# Patient Record
Sex: MALE | Race: WHITE
[De-identification: names, ages, dates, MRNs, and addresses within clinical notes are randomized per-mention and may not be internally consistent; named-entity substitution may affect disease eponyms.]

---

## 2019-08-03 ENCOUNTER — HOSPITAL ENCOUNTER (EMERGENCY)
Dept: HOSPITAL 94 - ER | Age: 84
Discharge: HOME | End: 2019-08-03
Payer: MEDICARE

## 2019-08-03 VITALS — HEIGHT: 69 IN | BODY MASS INDEX: 21.97 KG/M2 | WEIGHT: 148.3 LBS

## 2019-08-03 VITALS — DIASTOLIC BLOOD PRESSURE: 61 MMHG | SYSTOLIC BLOOD PRESSURE: 131 MMHG

## 2019-08-03 DIAGNOSIS — N13.8: ICD-10-CM

## 2019-08-03 DIAGNOSIS — I10: ICD-10-CM

## 2019-08-03 DIAGNOSIS — Z98.890: ICD-10-CM

## 2019-08-03 DIAGNOSIS — Z85.46: ICD-10-CM

## 2019-08-03 DIAGNOSIS — I25.10: ICD-10-CM

## 2019-08-03 DIAGNOSIS — Z95.1: ICD-10-CM

## 2019-08-03 DIAGNOSIS — Z90.49: ICD-10-CM

## 2019-08-03 DIAGNOSIS — N30.01: Primary | ICD-10-CM

## 2019-08-03 LAB
BACTERIA URNS QL MICRO: (no result) /HPF
BASOPHILS # BLD AUTO: 0 X10'3 (ref 0–0.2)
BASOPHILS NFR BLD AUTO: 0.4 % (ref 0–1)
CLARITY UR: (no result)
COLOR UR: (no result)
DEPRECATED SQUAMOUS URNS QL MICRO: (no result) /LPF
EOSINOPHIL # BLD AUTO: 0.1 X10'3 (ref 0–0.9)
EOSINOPHIL NFR BLD AUTO: 1.3 % (ref 0–6)
ERYTHROCYTE [DISTWIDTH] IN BLOOD BY AUTOMATED COUNT: 14.6 % (ref 11.5–14.5)
GLUCOSE UR STRIP-MCNC: (no result) MG/DL
HCT VFR BLD AUTO: 32.4 % (ref 42–52)
HGB BLD-MCNC: 11 G/DL (ref 14–17.9)
HGB UR QL STRIP: (no result)
KETONES UR STRIP-MCNC: (no result) MG/DL
LEUKOCYTE ESTERASE UR QL STRIP: (no result)
LYMPHOCYTES # BLD AUTO: 1.6 X10'3 (ref 1.1–4.8)
LYMPHOCYTES NFR BLD AUTO: 24.6 % (ref 21–51)
MCH RBC QN AUTO: 31.6 PG (ref 27–31)
MCHC RBC AUTO-ENTMCNC: 33.9 G/DL (ref 33–36.5)
MCV RBC AUTO: 93.2 FL (ref 78–98)
MONOCYTES # BLD AUTO: 0.7 X10'3 (ref 0–0.9)
MONOCYTES NFR BLD AUTO: 10.6 % (ref 2–12)
MUCOUS THREADS URNS QL MICRO: (no result) /LPF
NEUTROPHILS # BLD AUTO: 4 X10'3 (ref 1.8–7.7)
NEUTROPHILS NFR BLD AUTO: 63.1 % (ref 42–75)
NITRITE UR QL STRIP: (no result)
PH UR STRIP: (no result) [PH] (ref 4.8–8)
PLATELET # BLD AUTO: 186 X10'3 (ref 140–440)
PMV BLD AUTO: 9.4 FL (ref 7.4–10.4)
PROT UR QL STRIP: (no result) MG/DL
RBC # BLD AUTO: 3.48 X10'6 (ref 4.7–6.1)
RBC #/AREA URNS HPF: (no result) /HPF (ref 0–2)
SP GR UR STRIP: (no result) (ref 1–1.03)
URN COLLECT METHOD CLASS: (no result)
UROBILINOGEN UR STRIP-MCNC: (no result) E.U/DL (ref 0.2–1)
WBC # BLD AUTO: 6.3 X10'3 (ref 4.5–11)
WBC #/AREA URNS HPF: (no result) /HPF (ref 0–4)

## 2019-08-03 PROCEDURE — 87088 URINE BACTERIA CULTURE: CPT

## 2019-08-03 PROCEDURE — 85610 PROTHROMBIN TIME: CPT

## 2019-08-03 PROCEDURE — 81001 URINALYSIS AUTO W/SCOPE: CPT

## 2019-08-03 PROCEDURE — 51700 IRRIGATION OF BLADDER: CPT

## 2019-08-03 PROCEDURE — 36415 COLL VENOUS BLD VENIPUNCTURE: CPT

## 2019-08-03 PROCEDURE — 96372 THER/PROPH/DIAG INJ SC/IM: CPT

## 2019-08-03 PROCEDURE — 85025 COMPLETE CBC W/AUTO DIFF WBC: CPT

## 2019-08-03 PROCEDURE — 51702 INSERT TEMP BLADDER CATH: CPT

## 2019-08-03 PROCEDURE — 99284 EMERGENCY DEPT VISIT MOD MDM: CPT

## 2019-08-05 ENCOUNTER — HOSPITAL ENCOUNTER (EMERGENCY)
Dept: HOSPITAL 94 - ER | Age: 84
Discharge: HOME | End: 2019-08-05
Payer: MEDICARE

## 2019-08-05 VITALS — HEIGHT: 69 IN | WEIGHT: 148.3 LBS | BODY MASS INDEX: 21.97 KG/M2

## 2019-08-05 VITALS — SYSTOLIC BLOOD PRESSURE: 147 MMHG | DIASTOLIC BLOOD PRESSURE: 73 MMHG

## 2019-08-05 DIAGNOSIS — T83.098A: ICD-10-CM

## 2019-08-05 DIAGNOSIS — Z90.79: ICD-10-CM

## 2019-08-05 DIAGNOSIS — I25.10: ICD-10-CM
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
## 2019-08-05 DIAGNOSIS — I10: ICD-10-CM

## 2019-08-05 DIAGNOSIS — Z90.49: ICD-10-CM

## 2019-08-05 DIAGNOSIS — Z98.890: ICD-10-CM

## 2019-08-05 DIAGNOSIS — R31.9: ICD-10-CM

## 2019-08-05 DIAGNOSIS — Z95.1: ICD-10-CM

## 2019-08-05 DIAGNOSIS — T83.038A: Primary | ICD-10-CM
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
## 2019-08-05 DIAGNOSIS — Z79.2: ICD-10-CM

## 2019-08-05 PROCEDURE — 99284 EMERGENCY DEPT VISIT MOD MDM: CPT

## 2019-08-05 PROCEDURE — 51702 INSERT TEMP BLADDER CATH: CPT

## 2020-03-11 ENCOUNTER — HOSPITAL ENCOUNTER (EMERGENCY)
Dept: HOSPITAL 94 - ER | Age: 85
Discharge: HOME | End: 2020-03-11
Payer: MEDICARE

## 2020-03-11 VITALS — BODY MASS INDEX: 23.18 KG/M2 | HEIGHT: 69 IN | WEIGHT: 156.53 LBS

## 2020-03-11 VITALS — SYSTOLIC BLOOD PRESSURE: 120 MMHG | DIASTOLIC BLOOD PRESSURE: 44 MMHG

## 2020-03-11 DIAGNOSIS — R33.9: ICD-10-CM

## 2020-03-11 DIAGNOSIS — N13.9: Primary | ICD-10-CM

## 2020-03-11 DIAGNOSIS — Z72.89: ICD-10-CM

## 2020-03-11 DIAGNOSIS — R31.9: ICD-10-CM

## 2020-03-11 DIAGNOSIS — Z95.1: ICD-10-CM

## 2020-03-11 DIAGNOSIS — I10: ICD-10-CM

## 2020-03-11 DIAGNOSIS — Z85.9: ICD-10-CM

## 2020-03-11 DIAGNOSIS — Z90.89: ICD-10-CM

## 2020-03-11 LAB
BACTERIA URNS QL MICRO: (no result) /HPF
CLARITY UR: (no result)
COLOR UR: (no result)
DEPRECATED SQUAMOUS URNS QL MICRO: (no result) /LPF
GLUCOSE UR STRIP-MCNC: (no result) MG/DL
HGB UR QL STRIP: (no result)
KETONES UR STRIP-MCNC: (no result) MG/DL
LEUKOCYTE ESTERASE UR QL STRIP: (no result)
MUCOUS THREADS URNS QL MICRO: (no result) /LPF
NITRITE UR QL STRIP: (no result)
PH UR STRIP: (no result) [PH] (ref 4.8–8)
PROT UR QL STRIP: (no result) MG/DL
RBC #/AREA URNS HPF: (no result) /HPF (ref 0–2)
SP GR UR STRIP: (no result) (ref 1–1.03)
URN COLLECT METHOD CLASS: (no result)
UROBILINOGEN UR STRIP-MCNC: (no result) E.U/DL (ref 0.2–1)
WBC #/AREA URNS HPF: (no result) /HPF (ref 0–4)

## 2020-03-11 PROCEDURE — 87088 URINE BACTERIA CULTURE: CPT

## 2020-03-11 PROCEDURE — 81001 URINALYSIS AUTO W/SCOPE: CPT

## 2020-03-11 PROCEDURE — 99284 EMERGENCY DEPT VISIT MOD MDM: CPT

## 2020-03-11 PROCEDURE — 51702 INSERT TEMP BLADDER CATH: CPT

## 2020-03-13 ENCOUNTER — HOSPITAL ENCOUNTER (INPATIENT)
Dept: HOSPITAL 94 - ER | Age: 85
LOS: 2 days | Discharge: HOME HEALTH | DRG: 667 | End: 2020-03-15
Attending: SPECIALIST | Admitting: SPECIALIST
Payer: MEDICARE

## 2020-03-13 VITALS — HEIGHT: 69 IN | BODY MASS INDEX: 21.55 KG/M2 | WEIGHT: 145.51 LBS

## 2020-03-13 VITALS — DIASTOLIC BLOOD PRESSURE: 36 MMHG | SYSTOLIC BLOOD PRESSURE: 121 MMHG

## 2020-03-13 DIAGNOSIS — C61: ICD-10-CM
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
## 2020-03-13 DIAGNOSIS — Z96.611: ICD-10-CM

## 2020-03-13 DIAGNOSIS — T50.8X5A: ICD-10-CM

## 2020-03-13 DIAGNOSIS — Z95.1: ICD-10-CM

## 2020-03-13 DIAGNOSIS — Z90.79: ICD-10-CM

## 2020-03-13 DIAGNOSIS — D64.9: ICD-10-CM

## 2020-03-13 DIAGNOSIS — Z96.612: ICD-10-CM

## 2020-03-13 DIAGNOSIS — K62.7: ICD-10-CM

## 2020-03-13 DIAGNOSIS — I10: ICD-10-CM

## 2020-03-13 DIAGNOSIS — D49.4: ICD-10-CM

## 2020-03-13 DIAGNOSIS — Z66: ICD-10-CM

## 2020-03-13 DIAGNOSIS — E78.5: ICD-10-CM

## 2020-03-13 DIAGNOSIS — Z90.49: ICD-10-CM

## 2020-03-13 DIAGNOSIS — K21.9: ICD-10-CM

## 2020-03-13 DIAGNOSIS — Z79.899: ICD-10-CM

## 2020-03-13 DIAGNOSIS — Z92.3: ICD-10-CM

## 2020-03-13 DIAGNOSIS — I25.10: ICD-10-CM

## 2020-03-13 DIAGNOSIS — N30.41: Primary | ICD-10-CM

## 2020-03-13 DIAGNOSIS — N32.89: ICD-10-CM

## 2020-03-13 DIAGNOSIS — Z79.82: ICD-10-CM

## 2020-03-13 LAB
ALBUMIN SERPL BCP-MCNC: 3.2 G/DL (ref 3.4–5)
ANION GAP SERPL CALCULATED.3IONS-SCNC: 7 MMOL/L (ref 8–16)
BASOPHILS # BLD AUTO: 0 X10'3 (ref 0–0.2)
BASOPHILS NFR BLD AUTO: 0.4 % (ref 0–1)
BUN SERPL-MCNC: 31 MG/DL (ref 7–18)
BUN/CREAT SERPL: 30.1 (ref 5.4–32)
CALCIUM SERPL-MCNC: 9.1 MG/DL (ref 8.5–10.1)
CHLORIDE SERPL-SCNC: 110 MMOL/L (ref 99–107)
CO2 SERPL-SCNC: 25.8 MMOL/L (ref 24–32)
CREAT SERPL-MCNC: 1.03 MG/DL (ref 0.6–1.1)
EOSINOPHIL # BLD AUTO: 0.1 X10'3 (ref 0–0.9)
EOSINOPHIL NFR BLD AUTO: 1.8 % (ref 0–6)
ERYTHROCYTE [DISTWIDTH] IN BLOOD BY AUTOMATED COUNT: 14 % (ref 11.5–14.5)
GFR SERPL CREATININE-BSD FRML MDRD: 69 ML/MIN
GLUCOSE SERPL-MCNC: 112 MG/DL (ref 70–104)
HCT VFR BLD AUTO: 29.5 % (ref 42–52)
HGB BLD-MCNC: 9.7 G/DL (ref 14–17.9)
LYMPHOCYTES # BLD AUTO: 1.8 X10'3 (ref 1.1–4.8)
LYMPHOCYTES NFR BLD AUTO: 23.4 % (ref 21–51)
MCH RBC QN AUTO: 29 PG (ref 27–31)
MCHC RBC AUTO-ENTMCNC: 32.9 G/DL (ref 33–36.5)
MCV RBC AUTO: 88.3 FL (ref 78–98)
MONOCYTES # BLD AUTO: 0.8 X10'3 (ref 0–0.9)
MONOCYTES NFR BLD AUTO: 10.8 % (ref 2–12)
NEUTROPHILS # BLD AUTO: 4.9 X10'3 (ref 1.8–7.7)
NEUTROPHILS NFR BLD AUTO: 63.6 % (ref 42–75)
PLATELET # BLD AUTO: 257 X10'3 (ref 140–440)
PMV BLD AUTO: 8.6 FL (ref 7.4–10.4)
POTASSIUM SERPL-SCNC: 4.2 MMOL/L (ref 3.5–5.1)
RBC # BLD AUTO: 3.35 X10'6 (ref 4.7–6.1)
SODIUM SERPL-SCNC: 143 MMOL/L (ref 135–145)
WBC # BLD AUTO: 7.7 X10'3 (ref 4.5–11)

## 2020-03-13 PROCEDURE — 74177 CT ABD & PELVIS W/CONTRAST: CPT

## 2020-03-13 PROCEDURE — BW211ZZ COMPUTERIZED TOMOGRAPHY (CT SCAN) OF ABDOMEN AND PELVIS USING LOW OSMOLAR CONTRAST: ICD-10-PCS

## 2020-03-13 PROCEDURE — 87081 CULTURE SCREEN ONLY: CPT

## 2020-03-13 PROCEDURE — 93005 ELECTROCARDIOGRAM TRACING: CPT

## 2020-03-13 PROCEDURE — 96374 THER/PROPH/DIAG INJ IV PUSH: CPT

## 2020-03-13 PROCEDURE — 71045 X-RAY EXAM CHEST 1 VIEW: CPT

## 2020-03-13 PROCEDURE — 36415 COLL VENOUS BLD VENIPUNCTURE: CPT

## 2020-03-13 PROCEDURE — 85730 THROMBOPLASTIN TIME PARTIAL: CPT

## 2020-03-13 PROCEDURE — 85025 COMPLETE CBC W/AUTO DIFF WBC: CPT

## 2020-03-13 PROCEDURE — 80053 COMPREHEN METABOLIC PANEL: CPT

## 2020-03-13 PROCEDURE — 99285 EMERGENCY DEPT VISIT HI MDM: CPT

## 2020-03-13 PROCEDURE — 83735 ASSAY OF MAGNESIUM: CPT

## 2020-03-13 PROCEDURE — 80048 BASIC METABOLIC PNL TOTAL CA: CPT

## 2020-03-13 PROCEDURE — 85610 PROTHROMBIN TIME: CPT

## 2020-03-13 RX ADMIN — SODIUM CHLORIDE SCH MLS/HR: 9 INJECTION INTRAMUSCULAR; INTRAVENOUS; SUBCUTANEOUS at 20:15

## 2020-03-13 NOTE — NUR
Requested that patient wait at 1445 til I could get a room available at 1515. 
Went out to find patient to bring patient back to room. Patient unable to be 
found in lobby or outside.

## 2020-03-13 NOTE — NUR
pt recently in ER with urbano cath placed & connected to leg bag; pt reports that he has had 
ureteral leaking since it was placed

-------------------------------------------------------------------------------

Addendum: 03/14/20 at 0123 by Estefany Bradley RN

-------------------------------------------------------------------------------

Amended: Links added.

## 2020-03-13 NOTE — NUR
Called report to Sakshi EDWARDS on surgical floor, pt condition and treatment plan 
discussed, floor RN agrees to assume care of pt , pt transported by RN non-tele 
to floor.

## 2020-03-14 VITALS — DIASTOLIC BLOOD PRESSURE: 64 MMHG | SYSTOLIC BLOOD PRESSURE: 123 MMHG

## 2020-03-14 VITALS — SYSTOLIC BLOOD PRESSURE: 114 MMHG | DIASTOLIC BLOOD PRESSURE: 65 MMHG

## 2020-03-14 VITALS — SYSTOLIC BLOOD PRESSURE: 121 MMHG | DIASTOLIC BLOOD PRESSURE: 66 MMHG

## 2020-03-14 VITALS — DIASTOLIC BLOOD PRESSURE: 63 MMHG | SYSTOLIC BLOOD PRESSURE: 129 MMHG

## 2020-03-14 VITALS — SYSTOLIC BLOOD PRESSURE: 111 MMHG | DIASTOLIC BLOOD PRESSURE: 52 MMHG

## 2020-03-14 VITALS — SYSTOLIC BLOOD PRESSURE: 141 MMHG | DIASTOLIC BLOOD PRESSURE: 73 MMHG

## 2020-03-14 VITALS — SYSTOLIC BLOOD PRESSURE: 139 MMHG | DIASTOLIC BLOOD PRESSURE: 79 MMHG

## 2020-03-14 VITALS — SYSTOLIC BLOOD PRESSURE: 140 MMHG | DIASTOLIC BLOOD PRESSURE: 69 MMHG

## 2020-03-14 VITALS — SYSTOLIC BLOOD PRESSURE: 181 MMHG | DIASTOLIC BLOOD PRESSURE: 79 MMHG

## 2020-03-14 VITALS — DIASTOLIC BLOOD PRESSURE: 68 MMHG | SYSTOLIC BLOOD PRESSURE: 121 MMHG

## 2020-03-14 VITALS — DIASTOLIC BLOOD PRESSURE: 81 MMHG | SYSTOLIC BLOOD PRESSURE: 142 MMHG

## 2020-03-14 VITALS — SYSTOLIC BLOOD PRESSURE: 131 MMHG | DIASTOLIC BLOOD PRESSURE: 69 MMHG

## 2020-03-14 VITALS — SYSTOLIC BLOOD PRESSURE: 150 MMHG | DIASTOLIC BLOOD PRESSURE: 73 MMHG

## 2020-03-14 VITALS — SYSTOLIC BLOOD PRESSURE: 117 MMHG | DIASTOLIC BLOOD PRESSURE: 68 MMHG

## 2020-03-14 VITALS — SYSTOLIC BLOOD PRESSURE: 110 MMHG | DIASTOLIC BLOOD PRESSURE: 63 MMHG

## 2020-03-14 VITALS — SYSTOLIC BLOOD PRESSURE: 146 MMHG | DIASTOLIC BLOOD PRESSURE: 71 MMHG

## 2020-03-14 VITALS — SYSTOLIC BLOOD PRESSURE: 108 MMHG | DIASTOLIC BLOOD PRESSURE: 72 MMHG

## 2020-03-14 LAB
ALBUMIN SERPL BCP-MCNC: 2.6 G/DL (ref 3.4–5)
ALBUMIN/GLOB SERPL: 0.8 {RATIO} (ref 1.1–1.5)
ALP SERPL-CCNC: 86 IU/L (ref 46–116)
ALT SERPL W P-5'-P-CCNC: 17 U/L (ref 12–78)
ANION GAP SERPL CALCULATED.3IONS-SCNC: 8 MMOL/L (ref 8–16)
APTT PPP: 29 SECONDS (ref 22–32)
AST SERPL W P-5'-P-CCNC: 21 U/L (ref 10–37)
BASOPHILS # BLD AUTO: 0 X10'3 (ref 0–0.2)
BASOPHILS NFR BLD AUTO: 0.4 % (ref 0–1)
BILIRUB SERPL-MCNC: 0.3 MG/DL (ref 0.1–1)
BUN SERPL-MCNC: 24 MG/DL (ref 7–18)
BUN/CREAT SERPL: 25.5 (ref 5.4–32)
CALCIUM SERPL-MCNC: 8.5 MG/DL (ref 8.5–10.1)
CHLORIDE SERPL-SCNC: 112 MMOL/L (ref 99–107)
CO2 SERPL-SCNC: 26.5 MMOL/L (ref 24–32)
CREAT SERPL-MCNC: 0.94 MG/DL (ref 0.6–1.1)
EOSINOPHIL # BLD AUTO: 0.1 X10'3 (ref 0–0.9)
EOSINOPHIL NFR BLD AUTO: 1.6 % (ref 0–6)
ERYTHROCYTE [DISTWIDTH] IN BLOOD BY AUTOMATED COUNT: 13.7 % (ref 11.5–14.5)
GFR SERPL CREATININE-BSD FRML MDRD: 77 ML/MIN
GLUCOSE SERPL-MCNC: 100 MG/DL (ref 70–104)
HCT VFR BLD AUTO: 26.5 % (ref 42–52)
HGB BLD-MCNC: 8.9 G/DL (ref 14–17.9)
LYMPHOCYTES # BLD AUTO: 1.6 X10'3 (ref 1.1–4.8)
LYMPHOCYTES NFR BLD AUTO: 23.3 % (ref 21–51)
MAGNESIUM SERPL-MCNC: 1.6 MG/DL (ref 1.5–2.4)
MCH RBC QN AUTO: 29.6 PG (ref 27–31)
MCHC RBC AUTO-ENTMCNC: 33.8 G/DL (ref 33–36.5)
MCV RBC AUTO: 87.6 FL (ref 78–98)
MONOCYTES # BLD AUTO: 1 X10'3 (ref 0–0.9)
MONOCYTES NFR BLD AUTO: 13.9 % (ref 2–12)
NEUTROPHILS # BLD AUTO: 4.3 X10'3 (ref 1.8–7.7)
NEUTROPHILS NFR BLD AUTO: 60.8 % (ref 42–75)
PLATELET # BLD AUTO: 229 X10'3 (ref 140–440)
PMV BLD AUTO: 8.8 FL (ref 7.4–10.4)
POTASSIUM SERPL-SCNC: 3.9 MMOL/L (ref 3.5–5.1)
PROT SERPL-MCNC: 5.9 G/DL (ref 6.4–8.2)
RBC # BLD AUTO: 3.02 X10'6 (ref 4.7–6.1)
SODIUM SERPL-SCNC: 146 MMOL/L (ref 135–145)
WBC # BLD AUTO: 7 X10'3 (ref 4.5–11)

## 2020-03-14 PROCEDURE — 0T5C8ZZ DESTRUCTION OF BLADDER NECK, VIA NATURAL OR ARTIFICIAL OPENING ENDOSCOPIC: ICD-10-PCS | Performed by: UROLOGY

## 2020-03-14 PROCEDURE — 3E02340 INTRODUCTION OF INFLUENZA VACCINE INTO MUSCLE, PERCUTANEOUS APPROACH: ICD-10-PCS | Performed by: SPECIALIST

## 2020-03-14 PROCEDURE — 0TCB8ZZ EXTIRPATION OF MATTER FROM BLADDER, VIA NATURAL OR ARTIFICIAL OPENING ENDOSCOPIC: ICD-10-PCS | Performed by: UROLOGY

## 2020-03-14 PROCEDURE — 0V508ZZ DESTRUCTION OF PROSTATE, VIA NATURAL OR ARTIFICIAL OPENING ENDOSCOPIC: ICD-10-PCS | Performed by: UROLOGY

## 2020-03-14 RX ADMIN — DILTIAZEM HYDROCHLORIDE SCH MG: 180 CAPSULE, COATED, EXTENDED RELEASE ORAL at 08:00

## 2020-03-14 RX ADMIN — PANTOPRAZOLE SODIUM SCH MG: 40 TABLET, DELAYED RELEASE ORAL at 08:33

## 2020-03-14 RX ADMIN — SODIUM CHLORIDE SCH MLS/HR: 9 INJECTION INTRAMUSCULAR; INTRAVENOUS; SUBCUTANEOUS at 15:59

## 2020-03-14 RX ADMIN — SODIUM CHLORIDE SCH MLS/HR: 9 INJECTION INTRAMUSCULAR; INTRAVENOUS; SUBCUTANEOUS at 18:46

## 2020-03-14 NOTE — NUR
Dr Kelley paged re:[phone]. No orders received other than con't to monitor. Reported also to 
OR RN, Livia, when gave report.

## 2020-03-14 NOTE — NUR
Patient in room [facility] 346B. I have received report from JERRY Joiner   and had the opportunity 
to ask questions and assume patient care.

## 2020-03-14 NOTE — NUR
Problems reprioritized. Patient report given, questions answered & plan of care reviewed 
with JERRY Oro.

## 2020-03-14 NOTE — NUR
Received from OR via SURGICAL BED , accompanied by Anesthesiologist DR RAMON and report given 
by Anesthesiolgist. PT ACED ON O2 AND MONITOR, S/P CYSTOSCOPY AND CLOT EVCUATION, GENERAL 
ANESTH, PT HAS SAUCEDA CATH DRAINING CLEAR FLUID, DENIES ANY PAIN OR NAUSEA, ABD SOFT, WILL 
CONT TO ASSESS.

## 2020-03-14 NOTE — NUR
Patient in room . I have received report from JERRY Porras and had the opportunity to ask 
questions and assume patient care.

## 2020-03-14 NOTE — NUR
Report called to receiving nurse. Transferred via SURGICAL BED TO ROOM 346B Belongings . 

Special Issues communicated to receiving nurse.

## 2020-03-15 VITALS — SYSTOLIC BLOOD PRESSURE: 106 MMHG | DIASTOLIC BLOOD PRESSURE: 55 MMHG

## 2020-03-15 VITALS — DIASTOLIC BLOOD PRESSURE: 65 MMHG | SYSTOLIC BLOOD PRESSURE: 130 MMHG

## 2020-03-15 VITALS — SYSTOLIC BLOOD PRESSURE: 111 MMHG | DIASTOLIC BLOOD PRESSURE: 59 MMHG

## 2020-03-15 VITALS — SYSTOLIC BLOOD PRESSURE: 111 MMHG | DIASTOLIC BLOOD PRESSURE: 68 MMHG

## 2020-03-15 LAB
ALBUMIN SERPL BCP-MCNC: 2.3 G/DL (ref 3.4–5)
ALBUMIN/GLOB SERPL: 0.7 {RATIO} (ref 1.1–1.5)
ALP SERPL-CCNC: 85 IU/L (ref 46–116)
ALT SERPL W P-5'-P-CCNC: 17 U/L (ref 12–78)
ANION GAP SERPL CALCULATED.3IONS-SCNC: 8 MMOL/L (ref 8–16)
AST SERPL W P-5'-P-CCNC: 19 U/L (ref 10–37)
BASOPHILS # BLD AUTO: 0 X10'3 (ref 0–0.2)
BASOPHILS NFR BLD AUTO: 0.1 % (ref 0–1)
BILIRUB SERPL-MCNC: 0.3 MG/DL (ref 0.1–1)
BUN SERPL-MCNC: 24 MG/DL (ref 7–18)
BUN/CREAT SERPL: 24.2 (ref 5.4–32)
CALCIUM SERPL-MCNC: 8.2 MG/DL (ref 8.5–10.1)
CHLORIDE SERPL-SCNC: 112 MMOL/L (ref 99–107)
CO2 SERPL-SCNC: 26.8 MMOL/L (ref 24–32)
CREAT SERPL-MCNC: 0.99 MG/DL (ref 0.6–1.1)
EOSINOPHIL # BLD AUTO: 0 X10'3 (ref 0–0.9)
EOSINOPHIL NFR BLD AUTO: 0 % (ref 0–6)
ERYTHROCYTE [DISTWIDTH] IN BLOOD BY AUTOMATED COUNT: 13.2 % (ref 11.5–14.5)
GFR SERPL CREATININE-BSD FRML MDRD: 72 ML/MIN
GLUCOSE SERPL-MCNC: 188 MG/DL (ref 70–104)
HCT VFR BLD AUTO: 24.6 % (ref 42–52)
HGB BLD-MCNC: 8.4 G/DL (ref 14–17.9)
LYMPHOCYTES # BLD AUTO: 0.9 X10'3 (ref 1.1–4.8)
LYMPHOCYTES NFR BLD AUTO: 11.1 % (ref 21–51)
MAGNESIUM SERPL-MCNC: 1.6 MG/DL (ref 1.5–2.4)
MCH RBC QN AUTO: 30 PG (ref 27–31)
MCHC RBC AUTO-ENTMCNC: 34.2 G/DL (ref 33–36.5)
MCV RBC AUTO: 87.8 FL (ref 78–98)
MONOCYTES # BLD AUTO: 0.5 X10'3 (ref 0–0.9)
MONOCYTES NFR BLD AUTO: 6.3 % (ref 2–12)
NEUTROPHILS # BLD AUTO: 7 X10'3 (ref 1.8–7.7)
NEUTROPHILS NFR BLD AUTO: 82.5 % (ref 42–75)
PLATELET # BLD AUTO: 247 X10'3 (ref 140–440)
PMV BLD AUTO: 8.8 FL (ref 7.4–10.4)
POTASSIUM SERPL-SCNC: 3.8 MMOL/L (ref 3.5–5.1)
PROT SERPL-MCNC: 5.7 G/DL (ref 6.4–8.2)
RBC # BLD AUTO: 2.81 X10'6 (ref 4.7–6.1)
SODIUM SERPL-SCNC: 147 MMOL/L (ref 135–145)
WBC # BLD AUTO: 8.5 X10'3 (ref 4.5–11)

## 2020-03-15 RX ADMIN — PANTOPRAZOLE SODIUM SCH MG: 40 TABLET, DELAYED RELEASE ORAL at 10:01

## 2020-03-15 RX ADMIN — DILTIAZEM HYDROCHLORIDE SCH MG: 180 CAPSULE, COATED, EXTENDED RELEASE ORAL at 10:01

## 2020-03-15 NOTE — NUR
Problems reprioritized. Patient report given, questions answered & plan of care reviewed 
with JERRY Joiner. Patient stable at shift change

## 2020-03-15 NOTE — NUR
DC inst provided to pt & pt's family. IV DC'd, tip intact. All belongings sent w/pt. WC to 
front lobby.

## 2020-03-15 NOTE — NUR
Patient in room . I have received report from JERRY Oro and had the opportunity to 
ask questions and assume patient care.

## 2020-03-18 NOTE — NUR
Case Management DC follow up: spoke to pt via telephone. Reports "doing fine".  compliant 
w/cystocopy aftercare protocol. No issues noted. FC draining to gravity, no blood, no blood 
clots/pt always uses FC. Staying hydrated. Denies CP, emergent general pain, SOB, 
respiratory distress, NV, dizziness, syncope episodes, abd pain, bladder pain, HA, blurry 
vision. no s/urinary retention, remains afebrile. Verbalizes understanding of medications 
and why prescribed. Taking as ordered, no ase noted r/t polypharmacy/new meds. verbalizes 
understanding of s/s that would warrant 9-11/ER visit for evaluation. Acknowledges 
importance of scheduling/keeping appointments w/PCP/Dr Antwan Lockwood/will call to 
schedule/referrals/specialists/Dr Ba 03/23/2020.  Needs met, questions answered at IN. 
No further questions at this time.

## 2020-08-05 ENCOUNTER — HOSPITAL ENCOUNTER (EMERGENCY)
Dept: HOSPITAL 94 - ER | Age: 85
Discharge: HOME | End: 2020-08-05
Payer: MEDICARE

## 2020-08-05 VITALS — SYSTOLIC BLOOD PRESSURE: 119 MMHG | DIASTOLIC BLOOD PRESSURE: 53 MMHG

## 2020-08-05 VITALS — WEIGHT: 150.31 LBS | BODY MASS INDEX: 22.26 KG/M2 | HEIGHT: 69 IN

## 2020-08-05 DIAGNOSIS — Z98.890: ICD-10-CM

## 2020-08-05 DIAGNOSIS — Z90.49: ICD-10-CM

## 2020-08-05 DIAGNOSIS — I10: ICD-10-CM

## 2020-08-05 DIAGNOSIS — Z72.89: ICD-10-CM

## 2020-08-05 DIAGNOSIS — I25.10: ICD-10-CM

## 2020-08-05 DIAGNOSIS — M79.89: ICD-10-CM

## 2020-08-05 DIAGNOSIS — M79.672: Primary | ICD-10-CM

## 2020-08-05 DIAGNOSIS — Z79.899: ICD-10-CM

## 2020-08-05 DIAGNOSIS — Z85.9: ICD-10-CM

## 2020-08-05 PROCEDURE — 73630 X-RAY EXAM OF FOOT: CPT

## 2020-08-05 PROCEDURE — 99283 EMERGENCY DEPT VISIT LOW MDM: CPT

## 2020-08-11 ENCOUNTER — HOSPITAL ENCOUNTER (OUTPATIENT)
Dept: HOSPITAL 94 - VAS | Age: 85
Discharge: HOME | End: 2020-08-11
Attending: FAMILY MEDICINE
Payer: MEDICARE

## 2020-08-11 DIAGNOSIS — Z86.718: Primary | ICD-10-CM

## 2020-08-11 PROCEDURE — 93971 EXTREMITY STUDY: CPT

## 2020-09-16 NOTE — NUR
Report received from PACU RNZoya. Rotation Flap Text: The defect edges were debeveled with a #15 scalpel blade.  Given the location of the defect, shape of the defect and the proximity to free margins a rotation flap was deemed most appropriate.  Using a sterile surgical marker, an appropriate rotation flap was drawn incorporating the defect and placing the expected incisions within the relaxed skin tension lines where possible.    The area thus outlined was incised deep to adipose tissue with a #15 scalpel blade.  The skin margins were undermined to an appropriate distance in all directions utilizing iris scissors.

## 2023-04-28 ENCOUNTER — HOSPITAL ENCOUNTER (EMERGENCY)
Dept: HOSPITAL 94 - ER | Age: 88
Discharge: HOME | End: 2023-04-28
Payer: MEDICARE

## 2023-04-28 VITALS — DIASTOLIC BLOOD PRESSURE: 80 MMHG | SYSTOLIC BLOOD PRESSURE: 129 MMHG

## 2023-04-28 VITALS — HEIGHT: 69 IN | WEIGHT: 145.31 LBS | BODY MASS INDEX: 21.52 KG/M2

## 2023-04-28 DIAGNOSIS — K21.9: ICD-10-CM

## 2023-04-28 DIAGNOSIS — R19.4: Primary | ICD-10-CM

## 2023-04-28 DIAGNOSIS — I10: ICD-10-CM

## 2023-04-28 PROCEDURE — 99283 EMERGENCY DEPT VISIT LOW MDM: CPT

## 2023-09-21 ENCOUNTER — HOSPITAL ENCOUNTER (EMERGENCY)
Dept: HOSPITAL 94 - ER | Age: 88
Discharge: LEFT BEFORE DISCHARGE | End: 2023-09-21
Payer: MEDICARE

## 2023-09-21 VITALS
DIASTOLIC BLOOD PRESSURE: 90 MMHG | RESPIRATION RATE: 18 BRPM | TEMPERATURE: 97.6 F | OXYGEN SATURATION: 100 % | HEART RATE: 84 BPM | SYSTOLIC BLOOD PRESSURE: 175 MMHG

## 2023-09-21 VITALS — HEIGHT: 69 IN | BODY MASS INDEX: 20.93 KG/M2 | WEIGHT: 141.29 LBS

## 2023-09-21 DIAGNOSIS — Z53.21: ICD-10-CM

## 2023-09-21 DIAGNOSIS — M54.9: Primary | ICD-10-CM

## 2023-09-21 PROCEDURE — 99281 EMR DPT VST MAYX REQ PHY/QHP: CPT
